# Patient Record
Sex: FEMALE | Race: BLACK OR AFRICAN AMERICAN | NOT HISPANIC OR LATINO | Employment: OTHER | URBAN - METROPOLITAN AREA
[De-identification: names, ages, dates, MRNs, and addresses within clinical notes are randomized per-mention and may not be internally consistent; named-entity substitution may affect disease eponyms.]

---

## 2020-01-15 PROBLEM — E03.8 OTHER SPECIFIED HYPOTHYROIDISM: Status: ACTIVE | Noted: 2020-01-15

## 2020-01-15 PROBLEM — I10 HYPERTENSION: Status: ACTIVE | Noted: 2020-01-15

## 2020-01-15 PROBLEM — E11.9 TYPE 2 DIABETES MELLITUS WITHOUT COMPLICATION, WITHOUT LONG-TERM CURRENT USE OF INSULIN: Status: ACTIVE | Noted: 2020-01-15

## 2020-01-15 PROBLEM — Z21 HIV (HUMAN IMMUNODEFICIENCY VIRUS INFECTION): Status: ACTIVE | Noted: 2020-01-15

## 2020-01-15 PROBLEM — E11.9 TYPE 2 DIABETES MELLITUS WITHOUT COMPLICATION, WITHOUT LONG-TERM CURRENT USE OF INSULIN: Status: RESOLVED | Noted: 2020-01-15 | Resolved: 2020-01-15

## 2020-01-15 PROBLEM — B20 HIV (HUMAN IMMUNODEFICIENCY VIRUS INFECTION): Status: ACTIVE | Noted: 2020-01-15

## 2021-03-19 PROBLEM — E03.9 HYPOTHYROIDISM: Status: ACTIVE | Noted: 2020-01-15

## 2021-05-12 ENCOUNTER — PATIENT MESSAGE (OUTPATIENT)
Dept: RESEARCH | Facility: HOSPITAL | Age: 66
End: 2021-05-12

## 2021-07-01 ENCOUNTER — PATIENT MESSAGE (OUTPATIENT)
Dept: ADMINISTRATIVE | Facility: OTHER | Age: 66
End: 2021-07-01

## 2021-09-01 DIAGNOSIS — U07.1 COVID-19 VIRUS DETECTED: ICD-10-CM

## 2021-09-06 PROBLEM — U07.1 PNEUMONIA DUE TO COVID-19 VIRUS: Status: ACTIVE | Noted: 2021-09-06

## 2021-09-06 PROBLEM — J96.01 ACUTE HYPOXEMIC RESPIRATORY FAILURE: Status: ACTIVE | Noted: 2021-09-06

## 2021-09-06 PROBLEM — J12.82 PNEUMONIA DUE TO COVID-19 VIRUS: Status: ACTIVE | Noted: 2021-09-06

## 2021-09-08 PROBLEM — I10 HYPERTENSION: Status: RESOLVED | Noted: 2020-01-15 | Resolved: 2021-09-08

## 2021-09-09 ENCOUNTER — PATIENT MESSAGE (OUTPATIENT)
Dept: ADMINISTRATIVE | Facility: CLINIC | Age: 66
End: 2021-09-09

## 2021-09-10 ENCOUNTER — TELEPHONE (OUTPATIENT)
Dept: ADMINISTRATIVE | Facility: OTHER | Age: 66
End: 2021-09-10

## 2021-09-11 ENCOUNTER — TELEPHONE (OUTPATIENT)
Dept: ADMINISTRATIVE | Facility: CLINIC | Age: 66
End: 2021-09-11

## 2021-11-10 ENCOUNTER — PATIENT OUTREACH (OUTPATIENT)
Dept: ADMINISTRATIVE | Facility: HOSPITAL | Age: 66
End: 2021-11-10

## 2021-11-24 PROBLEM — K21.9 GASTROESOPHAGEAL REFLUX DISEASE WITHOUT ESOPHAGITIS: Status: ACTIVE | Noted: 2021-11-24

## 2021-11-24 PROBLEM — E78.5 HYPERLIPIDEMIA: Status: ACTIVE | Noted: 2021-11-24

## 2021-11-24 PROBLEM — Z72.0 TOBACCO ABUSE: Status: ACTIVE | Noted: 2021-11-24

## 2022-07-27 PROBLEM — E87.6 HYPOKALEMIA: Status: ACTIVE | Noted: 2022-07-27

## 2023-02-13 ENCOUNTER — PATIENT OUTREACH (OUTPATIENT)
Dept: ADMINISTRATIVE | Facility: OTHER | Age: 68
End: 2023-02-13

## 2023-02-13 PROBLEM — J44.9 CHRONIC OBSTRUCTIVE PULMONARY DISEASE, UNSPECIFIED COPD TYPE: Status: ACTIVE | Noted: 2023-02-13

## 2023-02-13 NOTE — PROGRESS NOTES
CHW - Initial Contact    This Community Health Worker completed  the Social Determinant of Health questionnaire with patient during clinic visit today.    Pt identified barriers of most importance are: patient identified barrier of importance stress concerns with family . Will revisit   Referrals to community agencies completed with patient consent outside of [facility] include: No community referral needed during clinic visit  Referrals were put through [facility] - no:   Support and Services: No community support services needed during clinic visit   Other information discussed the patient needs help with: patient discussed no other information during clinic visit   Follow up required: Yes  Follow-up Outreach - Due: 2/27/2023

## 2023-03-01 ENCOUNTER — PATIENT OUTREACH (OUTPATIENT)
Dept: ADMINISTRATIVE | Facility: OTHER | Age: 68
End: 2023-03-01

## 2023-03-01 NOTE — PROGRESS NOTES
CHW - Follow Up    This Community Health Worker completed a follow up visit with patient via telephone today.  Pt reported: patient reported she is doing okay. No assistance is needed during phone interview. Will   Reach out if assistance is  needed.   Community Health Worker provided: CHW spoke to patient she is doing okay  Follow up required: No  No future outreach task assigned     CHW - Case Closure    This Community Health Worker spoke to patient via telephone today.   Pt reported: patient reported she is okay No assistance needed will reach out if assistance is needed   Pt denied any additional needs at this time and agrees with episode closure at this time.  Provided patient with Community Health Worker's contact information and encouraged /her to contact this Community Health Worker if additional needs arise.

## 2023-05-25 DIAGNOSIS — E11.9 TYPE 2 DIABETES MELLITUS WITHOUT COMPLICATION: ICD-10-CM

## 2023-05-26 ENCOUNTER — PATIENT MESSAGE (OUTPATIENT)
Dept: ADMINISTRATIVE | Facility: HOSPITAL | Age: 68
End: 2023-05-26

## 2023-11-06 PROBLEM — Z91.89 AT INCREASED RISK FOR CARDIOVASCULAR DISEASE: Status: ACTIVE | Noted: 2023-11-06

## 2023-11-06 PROBLEM — R00.2 PALPITATIONS: Status: ACTIVE | Noted: 2023-11-06

## 2023-11-06 PROBLEM — R94.31 ABNORMAL EKG: Status: ACTIVE | Noted: 2023-11-06

## 2024-04-01 PROBLEM — B20 HIV (HUMAN IMMUNODEFICIENCY VIRUS INFECTION): Status: RESOLVED | Noted: 2020-01-15 | Resolved: 2024-04-01

## 2024-04-01 PROBLEM — U07.1 PNEUMONIA DUE TO COVID-19 VIRUS: Status: RESOLVED | Noted: 2021-09-06 | Resolved: 2024-04-01

## 2024-04-01 PROBLEM — J12.82 PNEUMONIA DUE TO COVID-19 VIRUS: Status: RESOLVED | Noted: 2021-09-06 | Resolved: 2024-04-01

## 2024-04-01 PROBLEM — Z21 HIV (HUMAN IMMUNODEFICIENCY VIRUS INFECTION): Status: RESOLVED | Noted: 2020-01-15 | Resolved: 2024-04-01

## 2024-04-01 PROBLEM — J96.01 ACUTE HYPOXEMIC RESPIRATORY FAILURE: Status: RESOLVED | Noted: 2021-09-06 | Resolved: 2024-04-01

## 2024-04-01 PROBLEM — R07.89 ANTERIOR CHEST WALL PAIN: Status: ACTIVE | Noted: 2024-04-01

## 2024-04-01 PROBLEM — E87.6 HYPOKALEMIA: Status: RESOLVED | Noted: 2022-07-27 | Resolved: 2024-04-01

## 2024-04-01 PROBLEM — E03.9 ADULT HYPOTHYROIDISM: Status: RESOLVED | Noted: 2020-01-15 | Resolved: 2024-04-01

## 2024-04-01 PROBLEM — G89.11 ACUTE PAIN DUE TO TRAUMA: Status: ACTIVE | Noted: 2024-04-01

## 2024-04-01 PROBLEM — R07.89 ATYPICAL CHEST PAIN: Status: ACTIVE | Noted: 2024-04-01

## 2024-04-01 PROBLEM — R94.39 ABNORMAL FINDING ON CARDIOVASCULAR STRESS TEST: Status: ACTIVE | Noted: 2024-04-01

## 2024-04-03 ENCOUNTER — PATIENT OUTREACH (OUTPATIENT)
Dept: ADMINISTRATIVE | Facility: CLINIC | Age: 69
End: 2024-04-03

## 2024-04-03 NOTE — PROGRESS NOTES
C3 nurse spoke with Yessica Mcdaniels for a TCC post hospital discharge follow up call. The patient has a scheduled [facility] appointment with Slade Hall MD on 04/09/24 @ 9003.

## 2024-04-18 ENCOUNTER — TELEPHONE (OUTPATIENT)
Dept: SMOKING CESSATION | Facility: CLINIC | Age: 69
End: 2024-04-18

## 2024-04-22 ENCOUNTER — CLINICAL SUPPORT (OUTPATIENT)
Dept: SMOKING CESSATION | Facility: CLINIC | Age: 69
End: 2024-04-22
Payer: COMMERCIAL

## 2024-04-22 DIAGNOSIS — F17.200 NICOTINE DEPENDENCE: Primary | ICD-10-CM

## 2024-04-22 PROCEDURE — 99404 PREV MED CNSL INDIV APPRX 60: CPT | Mod: S$GLB,,, | Performed by: GENERAL PRACTICE

## 2024-04-22 RX ORDER — IBUPROFEN 200 MG
1 TABLET ORAL DAILY
Qty: 14 PATCH | Refills: 0 | Status: SHIPPED | OUTPATIENT
Start: 2024-04-22

## 2024-04-22 NOTE — Clinical Note
Met with patient to conduct Quit Attempt 1 intake appointment. Patient will be participating in weekly tobacco cessation meetings and will begin the prescribed tobacco cessation medication 21 mg patches. FTND of 6 indicates a low level of dependence to tobacco. MARIA ISABEL-D of 11 is perceived as no mental distress/ depression.

## 2024-05-06 ENCOUNTER — CLINICAL SUPPORT (OUTPATIENT)
Dept: SMOKING CESSATION | Facility: CLINIC | Age: 69
End: 2024-05-06
Payer: COMMERCIAL

## 2024-05-06 DIAGNOSIS — F17.200 NICOTINE DEPENDENCE: Primary | ICD-10-CM

## 2024-05-06 PROCEDURE — 99404 PREV MED CNSL INDIV APPRX 60: CPT | Mod: S$GLB,,, | Performed by: GENERAL PRACTICE

## 2024-05-06 NOTE — Clinical Note
Spoke with patient at length in clinic regarding tobacco cessation progress. Patient remains on prescribed tobacco cessation medication regimen of 21 mg patch without any negative side effects at this time. Patient currently smokes 7 cigarettes a day. Patient says she smokes mainly outside; sometimes in the bathroom. Triggers: stress and coffee (4-5 cups).Patient wants to quit smoking because she has COPD, diabetic, and high cholesterol. Advised the patient to put her cigarettes in a different location. Discussed learned addiction model, cues/triggers, personal reasons for quitting, medications, goals. The patient will continue with therapy sessions and medication monitoring by CTTS. Prescribed medication management will be by physician. The patient denies any abnormal behavioral or mental changes at this time.

## 2024-05-06 NOTE — PROGRESS NOTES
Individual Follow-Up Form    5/6/2024    Quit Date: TBD    Clinical Status of Patient: Outpatient    Length of Service: 60 minutes    Continuing Medication: yes  Patches    Other Medications: 2mg nicotine lozenges     Target Symptoms: Withdrawal and medication side effects. The following were rated moderate (3) to severe (4) on TCRS:  Moderate (3): none  Severe (4): none    Comments: Spoke with patient at length in clinic regarding tobacco cessation progress. Patient remains on prescribed tobacco cessation medication regimen of 21 mg patch without any negative side effects at this time. Patient currently smokes 7 cigarettes a day. Patient says she smokes mainly outside; sometimes in the bathroom. Triggers: stress and coffee (4-5 cups).Patient wants to quit smoking because she has COPD, diabetic, and high cholesterol. Advised the patient to put her cigarettes in a different location. Discussed learned addiction model, cues/triggers, personal reasons for quitting, medications, goals. The patient will continue with therapy sessions and medication monitoring by CTTS. Prescribed medication management will be by physician. The patient denies any abnormal behavioral or mental changes at this time.     Diagnosis: F17.200    Next Visit: 2 weeks

## 2024-05-29 ENCOUNTER — CLINICAL SUPPORT (OUTPATIENT)
Dept: SMOKING CESSATION | Facility: CLINIC | Age: 69
End: 2024-05-29
Payer: COMMERCIAL

## 2024-05-29 DIAGNOSIS — F17.200 NICOTINE DEPENDENCE: Primary | ICD-10-CM

## 2024-05-29 PROCEDURE — 99404 PREV MED CNSL INDIV APPRX 60: CPT | Mod: S$GLB,,, | Performed by: GENERAL PRACTICE
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
## 2024-06-17 ENCOUNTER — CLINICAL SUPPORT (OUTPATIENT)
Dept: SMOKING CESSATION | Facility: CLINIC | Age: 69
End: 2024-06-17
Payer: COMMERCIAL

## 2024-06-17 DIAGNOSIS — F17.200 NICOTINE DEPENDENCE: Primary | ICD-10-CM

## 2024-06-17 PROCEDURE — 99402 PREV MED CNSL INDIV APPRX 30: CPT | Mod: S$GLB,,, | Performed by: GENERAL PRACTICE

## 2024-06-17 RX ORDER — VARENICLINE TARTRATE 1 MG/1
1 TABLET, FILM COATED ORAL DAILY
Qty: 56 TABLET | Refills: 0 | Status: SHIPPED | OUTPATIENT
Start: 2024-06-17

## 2024-06-17 NOTE — Clinical Note
Patient discontinued the prescribed tobacco cessation medication regimen of 21 mg patch due to the fact that she does not like the feel of it on her arms. Ordered Chantix (Varenicline) 1mg twice a day. Patient smokes 3 cigarettes a day. Patient says she smokes mainly outside. Triggers remains stress and coffee. Encouraged her to make a quit attempt this week. Patient says her weight has increase because she has been eating more. We discussed healthy items to eat. Patient current walks up to 3 miles a day for exercise. Reviewed strategies, controlling environment, cues, triggers, new goals set. Introduced high risk situations with preparation interventions, caffeine similarities with withdrawal issues of habit and nicotine, Alcohol, Understanding urges, cravings, stress and relaxation. The patient will continue with therapy sessions and medication monitoring by CTTS. Prescribed medication management will be by physician. The patient denies any abnormal behavioral or mental changes at this time.

## 2024-06-17 NOTE — PROGRESS NOTES
Individual Follow-Up Form    6/17/2024    Quit Date: TBD    Clinical Status of Patient: Outpatient    Length of Service: 30 minutes    Continuing Medication: no    Other Medications: none     Target Symptoms: Withdrawal and medication side effects. The following were rated moderate (3) to severe (4) on TCRS:  Moderate (3): none  Severe (4): none    Comments: Spoke with patient at length via phone regarding tobacco cessation progress. Patient discontinued the prescribed tobacco cessation medication regimen of 21 mg patch due to the fact that she does not like the feel of it on her arms. Ordered Chantix (Varenicline) 1mg twice a day. Patient currently smokes 3 cigarettes a day and does not relight them. Patient says she smokes mainly outside for the most part. Triggers remains stress and coffee. Encouraged her to make a quit attempt this week. Patient says her weight has increase because she has been eating more. We discussed healthy items to eat. Patient current walks up to 3 miles a day for exercise. Reviewed strategies, controlling environment, cues, triggers, new goals set. Introduced high risk situations with preparation interventions, caffeine similarities with withdrawal issues of habit and nicotine, Alcohol, Understanding urges, cravings, stress and relaxation. The patient will continue with therapy sessions and medication monitoring by CTTS. Prescribed medication management will be by physician. The patient denies any abnormal behavioral or mental changes at this time.     Diagnosis: F17.200    Next Visit: 2 weeks

## 2024-06-17 NOTE — Clinical Note
Patient discontinued the prescribed tobacco cessation medication regimen of 21 mg patch due to the fact that she does not like the feel of it on her arms. Ordered Chantix 1mg twice a day. Patient currently smokes 3 cigarettes a day. Patient says she smokes mainly outside for the most part. Triggers remains stress and coffee. Encouraged her to make a quit attempt this week. Patient says her weight has increase because she has been eating more. We discussed healthy items to eat. Patient current walks up to 3 miles a day for exercise. Reviewed strategies, controlling environment, cues, triggers, new goals set. Introduced high risk situations with preparation interventions, caffeine similarities with withdrawal issues of habit and nicotine, Alcohol, Understanding urges, cravings, stress and relaxation. The patient will continue with therapy sessions and medication monitoring by CTTS. Prescribed medication management will be by physician. The patient denies any abnormal behavioral or mental changes at this time.

## 2024-07-01 ENCOUNTER — CLINICAL SUPPORT (OUTPATIENT)
Dept: SMOKING CESSATION | Facility: CLINIC | Age: 69
End: 2024-07-01
Payer: COMMERCIAL

## 2024-07-01 DIAGNOSIS — F17.200 NICOTINE DEPENDENCE: Primary | ICD-10-CM

## 2024-07-01 NOTE — Clinical Note
Patient discontinued the prescribed tobacco cessation medication regimen of 21 mg patch and has not picked up the order Chantix (Varenicline). Patient says that she had her last cig on 6/28/24. Her quit date is 6/29/24. I commended her on quitting.  Patient says she has not been feeling well and she is not sure what is going on. I encouraged her to come to the ER to get evaluated. She said she think she was going to get her daughter to bring her. She says she has not issues with eat nor sleeping. Reviewed strategies, habitual behavior, high risks situations, understanding urges and cravings, stress and relaxation with open discussion and additional interventions, Introduced lapses, relapses, understanding them and analyzing the situation of a lapse, conflict issues that may be linked to a lapse. The patient will continue with therapy sessions and medication monitoring by CTTS. Prescribed medication management will be by physician. The patient denies any abnormal behavioral or mental changes at this time.

## 2024-07-01 NOTE — PROGRESS NOTES
Individual Follow-Up Form    7/1/2024    Quit Date: 6/29/24    Clinical Status of Patient: Outpatient    Length of Service: 30 minutes    Continuing Medication: no    Other Medications: none     Target Symptoms: Withdrawal and medication side effects. The following were rated moderate (3) to severe (4) on TCRS:  Moderate (3): none   Severe (4): none    Comments: Patient had not made to into the clinic 20 minutes after appointment time. I called to follow up. Spoke with patient at length via phone regarding tobacco cessation progress. Patient discontinued the prescribed tobacco cessation medication regimen of 21 mg patch and has not picked up the order Chantix (Varenicline). Patient says that she had her last cig on 6/28/24. Her quit date is 6/29/24. I commended her on quitting.  Patient says she has not been feeling well and she is not sure what is going on. I encouraged her to come to the ER to get evaluated. She said she think she was going to get her daughter to bring her. She says she has not issues with eat nor sleeping. Reviewed strategies, habitual behavior, high risks situations, understanding urges and cravings, stress and relaxation with open discussion and additional interventions, Introduced lapses, relapses, understanding them and analyzing the situation of a lapse, conflict issues that may be linked to a lapse. The patient will continue with therapy sessions and medication monitoring by CTTS. Prescribed medication management will be by physician. The patient denies any abnormal behavioral or mental changes at this time.     Diagnosis: F17.200    Next Visit: 2 weeks

## 2024-07-08 PROBLEM — G89.11 ACUTE PAIN DUE TO TRAUMA: Status: RESOLVED | Noted: 2024-04-01 | Resolved: 2024-07-08

## 2024-07-15 ENCOUNTER — CLINICAL SUPPORT (OUTPATIENT)
Dept: SMOKING CESSATION | Facility: CLINIC | Age: 69
End: 2024-07-15
Payer: COMMERCIAL

## 2024-07-15 DIAGNOSIS — F17.200 NICOTINE DEPENDENCE: Primary | ICD-10-CM

## 2024-07-15 NOTE — Clinical Note
Spoke with patient at length via phone regarding tobacco cessation progress. Patient has only use behavioral counseling to her aid her in her tobacco cessation progress. Patient remains quit as of 6/29/24. I commended her on quitting.  Patient mentioned that she has gotten past the urges an cravings. She rarely thinks about smoking. July 29th will be one month quit. She says she has not issues with eat nor sleeping. She often go for walking to exersice and help with sciatic nerve pain. Reviewed strategies, cues, triggers, high risk situations, lapses, relapses, diet, exercise, stress, relaxation, sleep, habitual behavior, and life style changes. The patient will continue with therapy sessions by CTTS. The patient denies any abnormal behavioral or mental

## 2024-07-15 NOTE — PROGRESS NOTES
Individual Follow-Up Form    7/15/2024    Quit Date: 6/29/24    Clinical Status of Patient: Outpatient    Length of Service: 30 minutes    Continuing Medication: no    Other Medications: none     Target Symptoms: Withdrawal and medication side effects. The following were rated moderate (3) to severe (4) on TCRS:  Moderate (3): none  Severe (4): none    Comments: Spoke with patient at length via phone regarding tobacco cessation progress. Patient has only use behavioral counseling to her aid her in her tobacco cessation progress. Patient remains quit as of 6/29/24. I commended her on quitting.  Patient mentioned that she has gotten past the urges an cravings. She rarely thinks about smoking. July 29th will be one month quit. She says she has not issues with eat nor sleeping. She often go for walking to exersice and help with sciatic nerve pain. Reviewed strategies, cues, triggers, high risk situations, lapses, relapses, diet, exercise, stress, relaxation, sleep, habitual behavior, and life style changes. The patient will continue with therapy sessions by CTTS. The patient denies any abnormal behavioral or mental     Diagnosis: F17.200    Next Visit: 2 weeks

## 2024-07-17 ENCOUNTER — TELEPHONE (OUTPATIENT)
Dept: SMOKING CESSATION | Facility: CLINIC | Age: 69
End: 2024-07-17
Payer: MEDICARE

## 2024-08-05 ENCOUNTER — TELEPHONE (OUTPATIENT)
Dept: SMOKING CESSATION | Facility: CLINIC | Age: 69
End: 2024-08-05
Payer: MEDICARE

## 2024-10-23 ENCOUNTER — CLINICAL SUPPORT (OUTPATIENT)
Dept: SMOKING CESSATION | Facility: CLINIC | Age: 69
End: 2024-10-23
Payer: COMMERCIAL

## 2024-10-23 DIAGNOSIS — F17.200 NICOTINE DEPENDENCE: Primary | ICD-10-CM

## 2024-10-23 PROCEDURE — 99407 BEHAV CHNG SMOKING > 10 MIN: CPT | Mod: S$GLB,,,

## 2024-11-12 ENCOUNTER — CLINICAL SUPPORT (OUTPATIENT)
Dept: SMOKING CESSATION | Facility: CLINIC | Age: 69
End: 2024-11-12
Payer: COMMERCIAL

## 2024-11-12 DIAGNOSIS — F17.200 NICOTINE DEPENDENCE: Primary | ICD-10-CM

## 2024-11-12 PROCEDURE — 99404 PREV MED CNSL INDIV APPRX 60: CPT | Mod: S$GLB,,, | Performed by: GENERAL PRACTICE

## 2024-11-12 RX ORDER — IBUPROFEN 200 MG
1 TABLET ORAL DAILY
Qty: 28 PATCH | Refills: 0 | Status: SHIPPED | OUTPATIENT
Start: 2024-11-12

## 2024-11-12 NOTE — PROGRESS NOTES
Met with patient in clinic to conduct Quit 2 intake appointment. Patient will be participating in weekly tobacco cessation meetings and will begin the prescribed tobacco cessation medication 14 mg patches. FTND of 5 indicates a high level of dependence to tobacco. MARIA ISABEL-D of 1 is perceived as no mental distress/ depression. CO2 monitor not working.

## 2024-11-26 ENCOUNTER — TELEPHONE (OUTPATIENT)
Dept: SMOKING CESSATION | Facility: CLINIC | Age: 69
End: 2024-11-26
Payer: MEDICARE

## 2024-11-26 NOTE — TELEPHONE ENCOUNTER
Patient has not arrived for clinic follow up appointment for smoking cessation. I called to confirm. Patient did not answer. I left a voice message with my contact information.

## 2024-12-03 ENCOUNTER — TELEPHONE (OUTPATIENT)
Dept: SMOKING CESSATION | Facility: CLINIC | Age: 69
End: 2024-12-03
Payer: MEDICARE

## 2024-12-03 NOTE — TELEPHONE ENCOUNTER
Attempted to follow up with patient regarding tobacco cessation progress. Patient did not answer.mailbox is full

## 2025-02-12 ENCOUNTER — TELEPHONE (OUTPATIENT)
Dept: SMOKING CESSATION | Facility: CLINIC | Age: 70
End: 2025-02-12
Payer: MEDICARE

## 2025-05-15 ENCOUNTER — TELEPHONE (OUTPATIENT)
Dept: SMOKING CESSATION | Facility: CLINIC | Age: 70
End: 2025-05-15
Payer: MEDICARE

## 2025-05-15 ENCOUNTER — CLINICAL SUPPORT (OUTPATIENT)
Dept: SMOKING CESSATION | Facility: CLINIC | Age: 70
End: 2025-05-15
Payer: COMMERCIAL

## 2025-05-15 DIAGNOSIS — F17.200 NICOTINE DEPENDENCE: Primary | ICD-10-CM

## 2025-05-15 NOTE — TELEPHONE ENCOUNTER
Called patient to confirm virtual Quit 3 intake appointment for smoking cessation. No answer. Left a voice message with my contact information.

## 2025-05-15 NOTE — PROGRESS NOTES
Spoke with patient today in regard to smoking cessation progress for 6 month phone follow up on quit 2.  Patient not tobacco free at this time.  Patient has scheduled an appointment to return to the program for Quit attempt #3.  Informed patient of benefit period, future follow ups, and contact information if any further help or support is needed.  Will complete smart form on Quit attempt #3.

## 2025-05-19 ENCOUNTER — CLINICAL SUPPORT (OUTPATIENT)
Dept: SMOKING CESSATION | Facility: CLINIC | Age: 70
End: 2025-05-19
Payer: COMMERCIAL

## 2025-05-19 DIAGNOSIS — Z72.0 TOBACCO ABUSE: ICD-10-CM

## 2025-05-19 RX ORDER — DM/P-EPHED/ACETAMINOPH/DOXYLAM 30-7.5/3
2 LIQUID (ML) ORAL
Qty: 144 LOZENGE | Refills: 0 | Status: SHIPPED | OUTPATIENT
Start: 2025-05-19

## 2025-05-19 NOTE — PROGRESS NOTES
Met with patient via phone to conduct smoking cessation Quit Attempt 3 intake appointment. Patient will be participating in weekly tobacco cessation meetings and will begin the prescribed tobacco cessation medication 2 mg lozenges. FTND of 4 indicates a low level of dependence to tobacco. MARIA ISABEL-D of 7 is perceived as no mental distress/ depression.

## 2025-05-28 PROBLEM — F17.200 NICOTINE DEPENDENCE: Status: ACTIVE | Noted: 2025-05-28

## 2025-06-02 ENCOUNTER — CLINICAL SUPPORT (OUTPATIENT)
Dept: SMOKING CESSATION | Facility: CLINIC | Age: 70
End: 2025-06-02
Payer: COMMERCIAL

## 2025-06-02 DIAGNOSIS — F17.200 NICOTINE DEPENDENCE: Primary | ICD-10-CM

## 2025-06-02 PROCEDURE — 99402 PREV MED CNSL INDIV APPRX 30: CPT | Mod: S$GLB,,, | Performed by: GENERAL PRACTICE

## 2025-06-23 ENCOUNTER — TELEPHONE (OUTPATIENT)
Dept: SMOKING CESSATION | Facility: CLINIC | Age: 70
End: 2025-06-23
Payer: MEDICARE

## 2025-06-23 NOTE — TELEPHONE ENCOUNTER
Patient has not arrived for clinic follow up appointment for smoking cessation. I called to confirm. No answer. Let a voice message with my contact information.

## 2025-06-30 PROBLEM — Z86.0100 HISTORY OF COLON POLYPS: Status: ACTIVE | Noted: 2025-06-30

## 2025-08-21 ENCOUNTER — PATIENT OUTREACH (OUTPATIENT)
Facility: OTHER | Age: 70
End: 2025-08-21
Payer: MEDICARE

## 2025-08-25 ENCOUNTER — TELEPHONE (OUTPATIENT)
Dept: SMOKING CESSATION | Facility: CLINIC | Age: 70
End: 2025-08-25
Payer: MEDICARE